# Patient Record
Sex: FEMALE | Race: WHITE | URBAN - NONMETROPOLITAN AREA
[De-identification: names, ages, dates, MRNs, and addresses within clinical notes are randomized per-mention and may not be internally consistent; named-entity substitution may affect disease eponyms.]

---

## 2018-03-14 PROBLEM — H11.823: Noted: 2018-03-14

## 2018-03-14 PROBLEM — H02.831: Noted: 2018-03-14

## 2018-03-14 PROBLEM — H02.834: Noted: 2018-03-14

## 2018-03-14 PROBLEM — H43.813: Noted: 2020-01-14

## 2018-03-14 PROBLEM — Z96.1: Noted: 2018-03-14

## 2018-03-14 PROBLEM — H16.223: Noted: 2020-01-14

## 2020-01-14 ENCOUNTER — IMPORTED ENCOUNTER (OUTPATIENT)
Dept: URBAN - NONMETROPOLITAN AREA CLINIC 1 | Facility: CLINIC | Age: 80
End: 2020-01-14

## 2020-01-14 PROCEDURE — 92014 COMPRE OPH EXAM EST PT 1/>: CPT

## 2020-01-14 NOTE — PATIENT DISCUSSION
Conjunctivochalsis OU / PRASANNA OU - discussed diagnosis in detail with patient - discussed signs and symptoms associated including affect on vision- recommend using AT's throughout the day such as Refresh or Systane - continue to monitor Pseudophakia OU - both intraocular lenses are stable and in place - s/p YAG PC OU - continue to monitor PVD OU -  Discussed findings of exam in detail with the patient. -  The risk of retinal detachment in patients with PVDs was discussed with the patient and the warning signs of retinal detachment were carefully reviewed with the patient. -  The patient was warned to return to the office or contact the ophthalmologist on call immediately if they experience signs of retinal detachment. Presbyopia OU - deferred MR today - recommend continuing OTC readers PRN; 's Notes: DFE 3/14/18

## 2021-11-04 NOTE — PATIENT DISCUSSION
Change CL rx from +3.50 to +3.25 OD, Gave 1DM and precision one, pt will wear the new rx OD with P1 and 1DM to determine which one she likes better to finalize.

## 2022-04-09 ASSESSMENT — VISUAL ACUITY
OS_CC: 20/25
OD_CC: 20/20-1

## 2022-04-09 ASSESSMENT — TONOMETRY
OD_IOP_MMHG: 18
OS_IOP_MMHG: 19

## 2023-01-04 NOTE — PATIENT DISCUSSION
Cataract symptoms i.e., glare, blur discussed. Pt to call if worsening vision or trouble with driving, TV, reading, ADL. UV precautions. Reviewed possibility of future cataract surgery.
Cataract(s) are not yet visually significant to the patient. Recommend monitoring, and patient agrees with this plan.
Change CL rx from +3.50 to +3.25 OD, Gave 1DM and precision one, pt will wear the new rx OD with P1 and 1DM to determine which one she likes better to finalize.
Contact lens Rx given - no change.
Discussed condition and exacerbating conditions/situations (e.g., dry/arid environments, overhead fans, air conditioners, side effect of medications).
Discussed lid hygiene, warm compress and eyelid wash.
Glasses Rx given.
Good postoperative appearance.
Instructed to call immediately if any new distortion, blurring, decreased vision or eye pain.
Monitor.
Patient given Rx for glasses.
Patient made aware of 24/7 emergency services.
Recommended artificial tears to use: 1 drop 4x a day in both eyes.
BACK PAIN